# Patient Record
Sex: FEMALE | Race: WHITE | NOT HISPANIC OR LATINO | URBAN - METROPOLITAN AREA
[De-identification: names, ages, dates, MRNs, and addresses within clinical notes are randomized per-mention and may not be internally consistent; named-entity substitution may affect disease eponyms.]

---

## 2024-05-21 ENCOUNTER — HOSPITAL ENCOUNTER (OUTPATIENT)
Dept: RADIOLOGY | Facility: EXTERNAL LOCATION | Age: 54
Discharge: HOME | End: 2024-05-21

## 2024-05-21 DIAGNOSIS — S99.921A RIGHT FOOT INJURY, INITIAL ENCOUNTER: ICD-10-CM

## 2024-05-21 DIAGNOSIS — M25.571 RIGHT ANKLE PAIN, UNSPECIFIED CHRONICITY: ICD-10-CM

## 2025-02-22 ENCOUNTER — OFFICE VISIT (OUTPATIENT)
Dept: URGENT CARE | Age: 55
End: 2025-02-22

## 2025-02-22 VITALS
HEART RATE: 87 BPM | HEIGHT: 62 IN | SYSTOLIC BLOOD PRESSURE: 175 MMHG | RESPIRATION RATE: 18 BRPM | BODY MASS INDEX: 25.4 KG/M2 | DIASTOLIC BLOOD PRESSURE: 84 MMHG | WEIGHT: 138 LBS | OXYGEN SATURATION: 100 % | TEMPERATURE: 97.4 F

## 2025-02-22 DIAGNOSIS — J01.00 ACUTE NON-RECURRENT MAXILLARY SINUSITIS: Primary | ICD-10-CM

## 2025-02-22 RX ORDER — AMOXICILLIN 500 MG/1
500 CAPSULE ORAL EVERY 12 HOURS SCHEDULED
Qty: 20 CAPSULE | Refills: 0 | Status: SHIPPED | OUTPATIENT
Start: 2025-02-22 | End: 2025-03-04

## 2025-02-22 ASSESSMENT — ENCOUNTER SYMPTOMS
ACTIVITY CHANGE: 0
FATIGUE: 0
SINUS PAIN: 1
FEVER: 0
SORE THROAT: 0
CHILLS: 0
NAUSEA: 0
COUGH: 0
DIZZINESS: 0
SINUS PRESSURE: 1
MYALGIAS: 0
APPETITE CHANGE: 0
ABDOMINAL PAIN: 0
ARTHRALGIAS: 0
RHINORRHEA: 0

## 2025-02-22 NOTE — PROGRESS NOTES
"Subjective   Patient ID: Flores Hilton is a 55 y.o. female. They present today with a chief complaint of Earache.    History of Present Illness  56 YO F c/o R ear pain/pressure with R maxillary pressure x 2 days.  No OTC medications today.  Aware of elevated blood pressure.  She was supposed to be on medication but does not have health insurance.        Earache   Pertinent negatives include no abdominal pain, coughing, ear discharge, rash, rhinorrhea or sore throat.       Past Medical History  Allergies as of 02/22/2025    (No Known Allergies)       (Not in a hospital admission)       No past medical history on file.    No past surgical history on file.         Review of Systems  Review of Systems   Constitutional:  Negative for activity change, appetite change, chills, fatigue and fever.   HENT:  Positive for congestion, ear pain, postnasal drip, sinus pressure and sinus pain. Negative for ear discharge, rhinorrhea and sore throat.    Respiratory:  Negative for cough.    Gastrointestinal:  Negative for abdominal pain and nausea.   Musculoskeletal:  Negative for arthralgias and myalgias.   Skin:  Negative for rash.   Neurological:  Negative for dizziness.                                  Objective    Vitals:    02/22/25 1618   BP: 175/84   Pulse: 87   Resp: 18   Temp: 36.3 °C (97.4 °F)   TempSrc: Oral   SpO2: 100%   Weight: 62.6 kg (138 lb)   Height: 1.575 m (5' 2\")     No LMP recorded.    Physical Exam  Vitals and nursing note reviewed.   Constitutional:       Appearance: Normal appearance.   HENT:      Right Ear: Ear canal and external ear normal. A middle ear effusion is present.      Left Ear: Ear canal and external ear normal. A middle ear effusion is present.      Nose: Nose normal.      Mouth/Throat:      Mouth: Mucous membranes are moist.   Eyes:      Conjunctiva/sclera: Conjunctivae normal.   Cardiovascular:      Rate and Rhythm: Normal rate and regular rhythm.      Heart sounds: Murmur heard.      Comments: " III/IV murmur heard at the LSB  Pulmonary:      Effort: Pulmonary effort is normal.      Breath sounds: Normal breath sounds.   Musculoskeletal:      Cervical back: Normal range of motion and neck supple.   Skin:     General: Skin is warm and dry.   Neurological:      Mental Status: She is alert and oriented to person, place, and time.         Procedures    Point of Care Test & Imaging Results from this visit  No results found for this visit on 02/22/25.   No results found.    Diagnostic study results (if any) were reviewed by Benita Vang PA-C.    Assessment/Plan   Allergies, medications, history, and pertinent labs/EKGs/Imaging reviewed by Benita Vang PA-C.     Medical Decision Making  Pt not aware of heart murmur.  She does not have health insurance and does not see a primary provider.  Encouraged to follow up with provider, with uncontrolled HTN and murmur she needs further evaluation and treatment.      Orders and Diagnoses  Diagnoses and all orders for this visit:  Acute non-recurrent maxillary sinusitis  -     amoxicillin (Amoxil) 500 mg capsule; Take 1 capsule (500 mg) by mouth every 12 hours for 10 days.      Medical Admin Record      Patient disposition: Home    Electronically signed by Benita Vang PA-C  4:24 PM